# Patient Record
(demographics unavailable — no encounter records)

---

## 2025-06-04 NOTE — HISTORY OF PRESENT ILLNESS
[FreeTextEntry1] : NIKOS CONLEY is a 50 year M presenting on 06/04/2025 PMH: right hydrocele, hypogonadism, varicocelecomy, inguinal hernia repair meds: Tcyp, zyrtec, vit D, C, K, aspirin   2 yr F/U  Hypogonadism: T cyp 0.3 q7 days Donates blood regularly, Q 54 days.  Urinary frequency for many years. Drinks tea Nocturia 0-1x No urgency or leakage.  No ED concerns  No tobacoo Mother thymus cancer?  5/30/25: TT 1005 Hct 52.9% E2 57  PSA: 0.65, free 12%, 8/2024 0.8, 5/2023 0.85, 4/2022 0.53, 10/2020

## 2025-06-04 NOTE — PHYSICAL EXAM
[Normal Appearance] : normal appearance [Well Groomed] : well groomed [General Appearance - In No Acute Distress] : no acute distress [Respiration, Rhythm And Depth] : normal respiratory rhythm and effort [Exaggerated Use Of Accessory Muscles For Inspiration] : no accessory muscle use [Urethral Meatus] : meatus normal [Penis Abnormality] : normal circumcised penis [Scrotum] : the scrotum was normal [Testes Tenderness] : no tenderness of the testes [Testes Mass (___cm)] : there were no testicular masses [Normal Station and Gait] : the gait and station were normal for the patient's age [] : no rash [No Focal Deficits] : no focal deficits [Oriented To Time, Place, And Person] : oriented to person, place, and time [Affect] : the affect was normal [Mood] : the mood was normal [de-identified] : B/L 20cc testes, no masses. Right 15cc hydrocele

## 2025-06-04 NOTE — ASSESSMENT
[FreeTextEntry1] : 49yo M hypogonadism -UA, Ucx -PSA with next labs -continue Tcyp 0.3mL weekly -hormones in 6 mo -F/U 1 yr